# Patient Record
Sex: MALE | Race: WHITE
[De-identification: names, ages, dates, MRNs, and addresses within clinical notes are randomized per-mention and may not be internally consistent; named-entity substitution may affect disease eponyms.]

---

## 2018-04-30 ENCOUNTER — HOSPITAL ENCOUNTER (OUTPATIENT)
Dept: HOSPITAL 53 - M RAD | Age: 53
End: 2018-04-30
Attending: FAMILY MEDICINE
Payer: COMMERCIAL

## 2018-04-30 DIAGNOSIS — E29.1: Primary | ICD-10-CM

## 2018-04-30 PROCEDURE — 77066 DX MAMMO INCL CAD BI: CPT

## 2018-06-18 ENCOUNTER — HOSPITAL ENCOUNTER (OUTPATIENT)
Dept: HOSPITAL 53 - M EKG | Age: 53
End: 2018-06-18
Attending: ANESTHESIOLOGY
Payer: COMMERCIAL

## 2018-06-18 DIAGNOSIS — G47.9: ICD-10-CM

## 2018-06-18 DIAGNOSIS — I45.10: ICD-10-CM

## 2018-06-18 DIAGNOSIS — Z01.818: Primary | ICD-10-CM

## 2018-06-18 DIAGNOSIS — I44.0: ICD-10-CM

## 2018-06-18 PROCEDURE — 93005 ELECTROCARDIOGRAM TRACING: CPT

## 2018-06-19 ENCOUNTER — HOSPITAL ENCOUNTER (OUTPATIENT)
Dept: HOSPITAL 53 - M SDC | Age: 53
Discharge: HOME | End: 2018-06-19
Attending: SURGERY
Payer: COMMERCIAL

## 2018-06-19 DIAGNOSIS — Z79.899: ICD-10-CM

## 2018-06-19 DIAGNOSIS — I10: ICD-10-CM

## 2018-06-19 DIAGNOSIS — D17.39: Primary | ICD-10-CM

## 2018-06-19 DIAGNOSIS — E78.5: ICD-10-CM

## 2018-06-19 DIAGNOSIS — E66.9: ICD-10-CM

## 2018-06-19 DIAGNOSIS — M10.9: ICD-10-CM

## 2018-06-19 DIAGNOSIS — G47.30: ICD-10-CM

## 2018-06-19 PROCEDURE — 19120 REMOVAL OF BREAST LESION: CPT

## 2018-06-19 RX ADMIN — BUPIVACAINE HYDROCHLORIDE AND EPINEPHRINE BITARTRATE 1 ML: 2.5; .005 INJECTION, SOLUTION EPIDURAL; INFILTRATION; INTRACAUDAL; PERINEURAL at 08:08

## 2019-06-13 ENCOUNTER — HOSPITAL ENCOUNTER (OUTPATIENT)
Dept: HOSPITAL 53 - M OPP | Age: 54
Discharge: HOME | End: 2019-06-13
Attending: SURGERY
Payer: COMMERCIAL

## 2019-06-13 VITALS — HEIGHT: 71 IN | BODY MASS INDEX: 44.1 KG/M2 | WEIGHT: 315 LBS

## 2019-06-13 VITALS — SYSTOLIC BLOOD PRESSURE: 170 MMHG | DIASTOLIC BLOOD PRESSURE: 93 MMHG

## 2019-06-13 DIAGNOSIS — K29.80: ICD-10-CM

## 2019-06-13 DIAGNOSIS — R10.11: ICD-10-CM

## 2019-06-13 DIAGNOSIS — K29.70: Primary | ICD-10-CM

## 2019-06-13 NOTE — ROOR
________________________________________________________________________________

Patient Name: Derick Soliman          Procedure Date: 6/13/2019 12:49 PM

MRN: [id number]                          Account Number: [id number]

YOB: 1965                Age: 54

Room: [facility]                            Gender: Male

Note Status: Finalized                 

________________________________________________________________________________

 

Procedure:           Upper GI endoscopy

Indications:         Abdominal pain in the right upper quadrant

Providers:           Leo J. Gosselin Jr, MD

Referring MD:        BETHANIE OVIEDO MD

Requesting Provider: 

Medicines:           Propofol per Anesthesia

Complications:       No immediate complications.

________________________________________________________________________________

Procedure:           Pre-Anesthesia Assessment:

                     - Prior to the procedure, a History and Physical was 

                     performed, and patient medications and allergies were 

                     reviewed. The patient is competent. The risks and 

                     benefits of the procedure and the sedation options and 

                     risks were discussed with the patient. All questions were 

                     answered and informed consent was obtained. Patient 

                     identification and proposed procedure were verified by 

                     the physician and the nurse in the pre-procedure area and 

                     in the procedure room. Mental Status Examination: alert 

                     and oriented. Airway Examination: normal oropharyngeal 

                     airway and neck mobility. Respiratory Examination: clear 

                     to auscultation. CV Examination: normal. ASA Grade 

                     Assessment: II - A patient with mild systemic disease. 

                     After reviewing the risks and benefits, the patient was 

                     deemed in satisfactory condition to undergo the 

                     procedure. The anesthesia plan was to use moderate 

                     sedation / analgesia (conscious sedation). Immediately 

                     prior to administration of medications, the patient was 

                     re-assessed for adequacy to receive sedatives. The heart 

                     rate, respiratory rate, oxygen saturations, blood 

                     pressure, adequacy of pulmonary ventilation, and response 

                     to care were monitored throughout the procedure. The 

                     physical status of the patient was re-assessed after the 

                     procedure.

                     The Endoscope was introduced through the mouth, and 

                     advanced to the second part of duodenum. The upper GI 

                     endoscopy was accomplished without difficulty. The 

                     patient tolerated the procedure well.

                                                                                

Findings:

     The upper third of the esophagus, middle third of the esophagus and lower 

     third of the esophagus were normal.

     The cardia, gastric fundus and gastric body were normal.

     Localized moderate inflammation characterized by congestion (edema), 

     erythema, friability and shallow ulcerations was found in the gastric 

     antrum. Biopsies were taken with a cold forceps for histology.

     Patchy moderate inflammation characterized by congestion (edema) and 

     erythema was found in the duodenal bulb.

     The first portion of the duodenum and second portion of the duodenum were 

     normal.

                                                                                

Impression:          - Normal upper third of esophagus, middle third of 

                     esophagus and lower third of esophagus.

                     - Normal cardia, gastric fundus and gastric body.

                     - Gastritis. Biopsied.

                     - Duodenitis.

                     - Normal first portion of the duodenum and second portion 

                     of the duodenum.

Recommendation:      - Discharge patient to home (ambulatory).

                     - Return to my office as previously scheduled.

                                                                                

 

Leo Gosselin MD

_____________________

Leo J Gosselin Jr, MD

6/13/2019 1:14:35 PM

Electronically signed by Leo Gosselin Jr , MD

Number of Addenda: 0

 

Note Initiated On: 6/13/2019 12:49 PM

Estimated Blood Loss:

     Estimated blood loss: none.

## 2019-06-14 ENCOUNTER — HOSPITAL ENCOUNTER (OUTPATIENT)
Dept: HOSPITAL 53 - M RAD | Age: 54
End: 2019-06-14
Attending: SURGERY
Payer: COMMERCIAL

## 2019-06-14 DIAGNOSIS — Z87.09: Primary | ICD-10-CM

## 2019-06-14 PROCEDURE — 74178 CT ABD&PLV WO CNTR FLWD CNTR: CPT

## 2019-06-14 NOTE — REP
CT ABDOMEN AND PELVIS WITH ORAL CONTRAST, WITH AND WITHOUT IV CONTRAST:

 

TECHNIQUE: Axial noncontrast images through the abdomen followed by

contrast-enhanced images through the abdomen and pelvis using 100 mL Isovue 370

intravenous contrast material, with coronal and sagittal reformations.

 

In the visualized lung bases there is a calcified granuloma in the left lower

lobe. There are mild bibasilar interstitial fibrotic changes. Calcified left

hilar lymph nodes are seen.

 

The liver demonstrates no mass. Tiny calcified granulomas are seen in the spleen.

Adrenals and pancreas appear normal. There is focal cortical scarring in the

upper pole of the left kidney. There is no hydronephrosis or nephrolithiasis

bilaterally. Ureters are normal in caliber. There is no gallbladder wall edema.

There is mild atherosclerotic calcification of the abdominal aorta without

aneurysm. There is no adenopathy. There is no free air or free fluid. There is no

bowel wall thickening. No pelvic mass is seen. Urinary bladder is mildly

distended and grossly unremarkable. I see no anterior abdominal wall defect.

There are degenerative changes of the spine.

 

IMPRESSION:

 

Evidence of prior granulomatous disease with calcified left hilar lymph nodes and

a calcified granuloma in the left lower lobe. There are calcified granulomas in

the spleen.

 

No significant abnormalities are identified in the abdomen or pelvis.

 

 

Electronically Signed by

Konstantin Navarro MD 06/15/2019 07:16 P

## 2020-02-26 ENCOUNTER — HOSPITAL ENCOUNTER (OUTPATIENT)
Dept: HOSPITAL 53 - M LAB REF | Age: 55
End: 2020-02-26
Payer: COMMERCIAL

## 2020-02-26 DIAGNOSIS — D48.5: Primary | ICD-10-CM

## 2021-01-14 ENCOUNTER — HOSPITAL ENCOUNTER (OUTPATIENT)
Dept: HOSPITAL 53 - M LABSMTC | Age: 56
End: 2021-01-14
Attending: PEDIATRICS
Payer: SELF-PAY

## 2021-01-14 DIAGNOSIS — Z20.822: Primary | ICD-10-CM

## 2021-05-03 ENCOUNTER — HOSPITAL ENCOUNTER (OUTPATIENT)
Dept: HOSPITAL 53 - M LABSMTC | Age: 56
End: 2021-05-03
Attending: PEDIATRICS
Payer: COMMERCIAL

## 2021-05-03 DIAGNOSIS — Z20.822: Primary | ICD-10-CM

## 2023-06-26 ENCOUNTER — HOSPITAL ENCOUNTER (OUTPATIENT)
Dept: HOSPITAL 53 - M RAD | Age: 58
End: 2023-06-26
Attending: NURSE PRACTITIONER
Payer: COMMERCIAL

## 2023-06-26 DIAGNOSIS — R06.02: Primary | ICD-10-CM

## 2024-09-05 ENCOUNTER — HOSPITAL ENCOUNTER (OUTPATIENT)
Dept: HOSPITAL 53 - M PLARAD | Age: 59
End: 2024-09-05
Attending: FAMILY MEDICINE
Payer: COMMERCIAL

## 2024-09-05 DIAGNOSIS — M54.12: Primary | ICD-10-CM

## 2024-09-05 DIAGNOSIS — M50.31: ICD-10-CM
